# Patient Record
Sex: MALE | Race: WHITE | Employment: UNEMPLOYED | URBAN - NONMETROPOLITAN AREA
[De-identification: names, ages, dates, MRNs, and addresses within clinical notes are randomized per-mention and may not be internally consistent; named-entity substitution may affect disease eponyms.]

---

## 2019-05-14 ENCOUNTER — HOSPITAL ENCOUNTER (EMERGENCY)
Facility: OTHER | Age: 59
Discharge: HOME | End: 2019-05-14
Attending: PHYSICIAN ASSISTANT | Admitting: PHYSICIAN ASSISTANT

## 2019-05-14 VITALS
BODY MASS INDEX: 27.2 KG/M2 | TEMPERATURE: 97.8 F | HEART RATE: 113 BPM | DIASTOLIC BLOOD PRESSURE: 105 MMHG | SYSTOLIC BLOOD PRESSURE: 174 MMHG | RESPIRATION RATE: 16 BRPM | OXYGEN SATURATION: 96 % | WEIGHT: 190 LBS | HEIGHT: 70 IN

## 2019-05-14 DIAGNOSIS — K04.7 DENTAL ABSCESS: ICD-10-CM

## 2019-05-14 DIAGNOSIS — R22.0 SWELLING OF LEFT SIDE OF FACE: ICD-10-CM

## 2019-05-14 PROCEDURE — 25000128 H RX IP 250 OP 636: Performed by: PHYSICIAN ASSISTANT

## 2019-05-14 PROCEDURE — 99284 EMERGENCY DEPT VISIT MOD MDM: CPT | Mod: 25 | Performed by: PHYSICIAN ASSISTANT

## 2019-05-14 PROCEDURE — 99283 EMERGENCY DEPT VISIT LOW MDM: CPT | Mod: Z6 | Performed by: PHYSICIAN ASSISTANT

## 2019-05-14 PROCEDURE — 96372 THER/PROPH/DIAG INJ SC/IM: CPT | Performed by: PHYSICIAN ASSISTANT

## 2019-05-14 RX ORDER — CLINDAMYCIN HCL 300 MG
300 CAPSULE ORAL 4 TIMES DAILY
Qty: 40 CAPSULE | Refills: 0 | Status: SHIPPED | OUTPATIENT
Start: 2019-05-14 | End: 2019-05-24

## 2019-05-14 RX ORDER — PRAVASTATIN SODIUM 40 MG
40 TABLET ORAL DAILY
COMMUNITY

## 2019-05-14 RX ORDER — ASPIRIN 81 MG/1
81 TABLET, CHEWABLE ORAL DAILY
COMMUNITY

## 2019-05-14 RX ORDER — GLIPIZIDE 5 MG/1
5 TABLET, FILM COATED, EXTENDED RELEASE ORAL DAILY
COMMUNITY

## 2019-05-14 RX ORDER — ACYCLOVIR 400 MG/1
400 TABLET ORAL
COMMUNITY

## 2019-05-14 RX ORDER — GEMFIBROZIL 600 MG/1
600 TABLET, FILM COATED ORAL
COMMUNITY

## 2019-05-14 RX ORDER — OMEPRAZOLE 20 MG/1
20 TABLET, DELAYED RELEASE ORAL DAILY
COMMUNITY

## 2019-05-14 RX ORDER — TIZANIDINE HYDROCHLORIDE 2 MG/1
2 CAPSULE, GELATIN COATED ORAL 3 TIMES DAILY
COMMUNITY

## 2019-05-14 RX ORDER — IBUPROFEN 800 MG/1
800 TABLET, FILM COATED ORAL EVERY 8 HOURS PRN
Qty: 60 TABLET | Refills: 0 | Status: SHIPPED | OUTPATIENT
Start: 2019-05-14 | End: 2019-05-22

## 2019-05-14 RX ORDER — HYDROCODONE BITARTRATE AND ACETAMINOPHEN 5; 325 MG/1; MG/1
1 TABLET ORAL EVERY 6 HOURS PRN
Qty: 15 TABLET | Refills: 0 | Status: SHIPPED | OUTPATIENT
Start: 2019-05-14

## 2019-05-14 RX ORDER — BENAZEPRIL HYDROCHLORIDE 40 MG/1
40 TABLET ORAL DAILY
COMMUNITY

## 2019-05-14 RX ORDER — KETOROLAC TROMETHAMINE 30 MG/ML
60 INJECTION, SOLUTION INTRAMUSCULAR; INTRAVENOUS ONCE
Status: COMPLETED | OUTPATIENT
Start: 2019-05-14 | End: 2019-05-14

## 2019-05-14 RX ADMIN — KETOROLAC TROMETHAMINE 60 MG: 30 INJECTION, SOLUTION INTRAMUSCULAR at 17:37

## 2019-05-14 ASSESSMENT — ENCOUNTER SYMPTOMS
ADENOPATHY: 0
SHORTNESS OF BREATH: 0
CHEST TIGHTNESS: 0
HEMATURIA: 0
BACK PAIN: 0
FEVER: 0
CONFUSION: 0
ABDOMINAL PAIN: 0
CHILLS: 1
FACIAL SWELLING: 1
WOUND: 0
BRUISES/BLEEDS EASILY: 0

## 2019-05-14 ASSESSMENT — MIFFLIN-ST. JEOR: SCORE: 1693.08

## 2019-05-14 NOTE — ED AVS SNAPSHOT
[facility]  [address]  [place] [address]  Phone:  [phone]  Fax:  [phone]                                    Robinson Hanson   MRN: [id number]    Department:  [facility]   Date of Visit:  5/14/2019           After Visit Summary Signature Page    I have received my discharge instructions, and my questions have been answered. I have discussed any challenges I see with this plan with the nurse or doctor.    ..........................................................................................................................................  Patient/Patient Representative Signature      ..........................................................................................................................................  Patient Representative Print Name and Relationship to Patient    ..................................................               ................................................  Date                                   Time    ..........................................................................................................................................  Reviewed by Signature/Title    ...................................................              ..............................................  Date                                               Time          22EPIC Rev 08/18

## 2019-05-14 NOTE — ED PROVIDER NOTES
History     Chief Complaint   Patient presents with     Dental Problem     This is a 58-year-old male who has been having increasing left lower molar pain over the past week or so.  Today he has noticed some lower jaw swelling and tenderness.  Denies any fever but some chills.  Some body aches.  He has not been able to locate a dentist.            Allergies:  Allergies   Allergen Reactions     Penicillins Swelling       Problem List:    There are no active problems to display for this patient.       Past Medical History:    No past medical history on file.    Past Surgical History:    No past surgical history on file.    Family History:    No family history on file.    Social History:  Marital Status:  Single [1]  Social History     Tobacco Use     Smoking status: Not on file   Substance Use Topics     Alcohol use: Not on file     Drug use: Not on file        Medications:      acyclovir (ZOVIRAX) 400 MG tablet   aspirin (ASA) 81 MG chewable tablet   benazepril (LOTENSIN) 40 MG tablet   clindamycin (CLEOCIN) 300 MG capsule   darunavir (PREZISTA) 600 MG TABS tablet   emtricitabine-tenofovir AF (DESCOVY) 200-25 MG per tablet   gemfibrozil (LOPID) 600 MG tablet   glipiZIDE (GLUCOTROL XL) 5 MG 24 hr tablet   HYDROcodone-acetaminophen (NORCO) 5-325 MG tablet   ibuprofen (ADVIL/MOTRIN) 800 MG tablet   omeprazole (PRILOSEC OTC) 20 MG EC tablet   pravastatin (PRAVACHOL) 40 MG tablet   ritonavir (NORVIR) 100 MG capsule   tiZANidine (ZANAFLEX) 2 MG capsule         Review of Systems   Constitutional: Positive for chills. Negative for fever.   HENT: Positive for dental problem and facial swelling. Negative for congestion.    Eyes: Negative for visual disturbance.   Respiratory: Negative for chest tightness and shortness of breath.    Cardiovascular: Negative for chest pain.   Gastrointestinal: Negative for abdominal pain.   Genitourinary: Negative for hematuria.   Musculoskeletal: Negative for back pain.   Skin: Negative for  "rash and wound.   Neurological: Negative for syncope.   Hematological: Negative for adenopathy. Does not bruise/bleed easily.   Psychiatric/Behavioral: Negative for confusion.       Physical Exam   BP: (!) 174/105  Pulse: 113  Heart Rate: 114  Temp: 97.8  F (36.6  C)  Resp: 16  Height: 177.8 cm (5' 10\")  Weight: 86.2 kg (190 lb)  SpO2: 96 %      Physical Exam   Constitutional: He is oriented to person, place, and time. He appears well-developed and well-nourished. No distress.   HENT:   Head: Normocephalic and atraumatic.   Mouth/Throat: No trismus in the jaw. Dental abscesses and dental caries present. No uvula swelling.       Abscess to left lower molar #18 with some surrounding gingival redness and swelling.  Left facial swelling.  No drainage.   Eyes: Conjunctivae are normal. No scleral icterus.   Neck: Neck supple.   Cardiovascular: Normal rate and regular rhythm.   Pulmonary/Chest: Effort normal.   Abdominal: Soft. There is no tenderness.   Musculoskeletal: Normal range of motion. He exhibits no deformity.   Lymphadenopathy:     He has no cervical adenopathy.   Neurological: He is alert and oriented to person, place, and time.   Skin: Skin is warm and dry. Capillary refill takes less than 2 seconds. No rash noted. He is not diaphoretic.   Psychiatric: He has a normal mood and affect.       ED Course        Procedures            No results found for this or any previous visit (from the past 24 hour(s)).    Medications   ketorolac (TORADOL) injection 60 mg (has no administration in time range)       Assessments & Plan (with Medical Decision Making)     I have reviewed the nursing notes.    I have reviewed the findings, diagnosis, plan and need for follow up with the patient.         Medication List      Started    clindamycin 300 MG capsule  Commonly known as:  CLEOCIN  300 mg, Oral, 4 TIMES DAILY     HYDROcodone-acetaminophen 5-325 MG tablet  Commonly known as:  NORCO  1 tablet, Oral, EVERY 6 HOURS PRN   "   ibuprofen 800 MG tablet  Commonly known as:  ADVIL/MOTRIN  800 mg, Oral, EVERY 8 HOURS PRN            Final diagnoses:   Dental abscess   Swelling of left side of face     Afebrile.  Vital signs stable.  Left lower molar #18 abscess with left lower facial swelling.  He was given Toradol IM in the ER for pain relief.  He has a penicillin allergy.  Rx for clindamycin, Motrin, and a short course of Norco.  Referral to the [facility] or Surya Artis DDS for definitive treatment.  5/14/2019   [facility] Daniel PA-C  05/14/19 0088

## 2019-05-14 NOTE — ED TRIAGE NOTES
"Pt here from home with complaint of lower left tooth pain.  Pain began 1 week ago.  Pt admits to taking \"a lot to Tylenol\" for the pain but that is not working anymore.   "

## (undated) RX ORDER — KETOROLAC TROMETHAMINE 30 MG/ML
INJECTION, SOLUTION INTRAMUSCULAR; INTRAVENOUS
Status: DISPENSED
Start: 2019-05-14